# Patient Record
Sex: FEMALE | Race: WHITE | URBAN - METROPOLITAN AREA
[De-identification: names, ages, dates, MRNs, and addresses within clinical notes are randomized per-mention and may not be internally consistent; named-entity substitution may affect disease eponyms.]

---

## 2017-02-02 ENCOUNTER — TELEPHONE (OUTPATIENT)
Dept: NEUROSURGERY | Facility: CLINIC | Age: 22
End: 2017-02-02

## 2017-02-02 NOTE — TELEPHONE ENCOUNTER
I received a page that Ms. Banks is feeling ill and not able to get into contact with someone to answer her questions.  I attempted to contact patient at the number in the chart and there was not answer.      Ag Simental MD,PhD  Neurosurgery PGY-1

## 2017-02-08 ENCOUNTER — TELEPHONE (OUTPATIENT)
Dept: NEUROSURGERY | Facility: CLINIC | Age: 22
End: 2017-02-08

## 2017-02-08 NOTE — TELEPHONE ENCOUNTER
Osorio's mother is frustrated with care at the  and has asked for records to be forwarded elsewhere.  She was given the number for medical records to facilitate that process.

## 2017-02-14 ENCOUNTER — TELEPHONE (OUTPATIENT)
Dept: OPHTHALMOLOGY | Facility: CLINIC | Age: 22
End: 2017-02-14

## 2017-02-14 NOTE — TELEPHONE ENCOUNTER
PCP  referring pt to neuro-ophthalmology for intracranial hypertension eval.  Pt has seen dr. Sabillon in past  Has had stent in past for pain and lumbar puncture in past  Past 2-3 weeks terrible headache pain with dizziness and blurred vision.  Complaints of aphasia.  Pt has had symptoms for over one year when pressure goes up  Headaches 24/7 for over one year  Past 1-2 days symptoms better.  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 Sabillon visit (records were provided)    Offered appt tomorrow and pt/mother unable  Scheduled next week with dr. Babb  Date worked for pt/mother  Note to facilitator/Dr. Babb for review  Nabil Choi RN 9:05 AM 02/14/17

## 2017-02-23 DIAGNOSIS — H53.10 SUBJECTIVE VISUAL DISTURBANCE: ICD-10-CM

## 2017-02-23 DIAGNOSIS — G93.2 IDIOPATHIC INTRACRANIAL HYPERTENSION: ICD-10-CM

## 2017-02-24 ENCOUNTER — OFFICE VISIT (OUTPATIENT)
Dept: OPHTHALMOLOGY | Facility: CLINIC | Age: 22
End: 2017-02-24
Attending: OPHTHALMOLOGY
Payer: COMMERCIAL

## 2017-02-24 DIAGNOSIS — H53.10 SUBJECTIVE VISUAL DISTURBANCE: ICD-10-CM

## 2017-02-24 DIAGNOSIS — R51.9 HEADACHE: Primary | ICD-10-CM

## 2017-02-24 DIAGNOSIS — G93.2 IDIOPATHIC INTRACRANIAL HYPERTENSION: ICD-10-CM

## 2017-02-24 PROCEDURE — 92133 CPTRZD OPH DX IMG PST SGM ON: CPT | Mod: ZF | Performed by: OPHTHALMOLOGY

## 2017-02-24 PROCEDURE — 99215 OFFICE O/P EST HI 40 MIN: CPT | Mod: ZF

## 2017-02-24 PROCEDURE — 92083 EXTENDED VISUAL FIELD XM: CPT | Mod: ZF | Performed by: OPHTHALMOLOGY

## 2017-02-24 ASSESSMENT — CONF VISUAL FIELD
OD_NORMAL: 1
OS_NORMAL: 1

## 2017-02-24 ASSESSMENT — TONOMETRY
IOP_METHOD: TONOPEN
OD_IOP_MMHG: 20
OS_IOP_MMHG: 17

## 2017-02-24 ASSESSMENT — VISUAL ACUITY
METHOD: SNELLEN - LINEAR
OD_SC: 20/20
OS_SC: 20/20

## 2017-02-24 NOTE — NURSING NOTE
Chief Complaints and History of Present Illnesses   Patient presents with     Neurologic Problem     II     HPI    Symptoms:              Comments:  Zee is a 21 year old female presenting with a history of:    1. Migraine Headaches  - Onset 5 years ago.   - no visual aura with headaches  - causes photosensitivity   - constant   - wake her up at night, wakes up with them  - no specific location.   - lying down helps  - does not improve with rest  - 8/10, describes as an intense ache.   - no tinnitus  - no vision concerns OU  - no diplopia  - last brain MRI June 2016 (done in [place], [facility] [place] 2[place]   - history of three spinal taps in the past. Last one was 12/2016. Was told opening pressure was high.   - was not started on any medications  - history left transverse sinus stent 02/2016. Helped reduce pressures in head for a while, but now back to more pain.     Radha JC 9:26 AM February 24, 2017

## 2017-02-24 NOTE — MR AVS SNAPSHOT
After Visit Summary   2/24/2017    Zee Banks    MRN: [id number]           Patient Information     Date Of Birth          1995        Visit Information        Provider Department      2/24/2017 10:00 AM [facility]        Today's Diagnoses     Headache    -  1    Idiopathic intracranial hypertension        Subjective visual disturbance           Follow-ups after your visit        Additional Services     NEUROLOGY ADULT REFERRAL       Your provider has referred you to: [facility]: [facility] [phone]   [email or web address]    Reason for Referral: Consult    Please be aware that coverage of these services is subject to the terms and limitations of your health insurance plan.  Call member services at your health plan with any benefit or coverage questions.      Please bring the following with you to your appointment:    (1) Any X-Rays, CTs or MRIs which have been performed.  Contact the facility where they were done to arrange for  prior to your scheduled appointment.    (2) List of current medications  (3) This referral request   (4) Any documents/labs given to you for this referral                  Who to contact     Please call your clinic at [phone] to:    Ask questions about your health    Make or cancel appointments    Discuss your medicines    Learn about your test results    Speak to your doctor   If you have compliments or concerns about an experience at your clinic, or if you wish to file a complaint, please contact [facility] Patient Relations at [phone] or email us at [email or web address]         Additional Information About Your Visit        MyChart Information     [employer] is an electronic gateway that provides easy, online access to your medical records. With [employer], you can request a clinic appointment, read your test results, renew a prescription or  communicate with your care team.     To sign up for [email or web address] visit the website at [email or web address]   You will be asked to enter the access code listed below, as well as some personal information. Please follow the directions to create your username and password.     Your access code is: [email or web address]  Expires: 2017  8:30 AM     Your access code will  in 90 days. If you need help or a new code, please contact your [facility] or call [phone] for assistance.        Care EveryWhere ID     This is your Care EveryWhere ID. This could be used by other organizations to access your [place] medical records  [id number]         Blood Pressure from Last 3 Encounters:   16 119/68    Weight from Last 3 Encounters:   16 120 kg (264 lb 8 oz)              We Performed the Following     Glaucoma Top OU     IOP Measurement     NEUROLOGY ADULT REFERRAL     OCT Optic Nerve RNFL Spectralis OU (both eyes)        Primary Care Provider Office Phone # Fax #    Carlos Manuel Ortiz [phone]       [address] [address]        Thank you!     Thank you for choosing [facility]  for your care. Our goal is always to provide you with excellent care. Hearing back from our patients is one way we can continue to improve our services. Please take a few minutes to complete the written survey that you may receive in the mail after your visit with us. Thank you!             Your Updated Medication List - Protect others around you: Learn how to safely use, store and throw away your medicines at www.disposemymeds.org.          This list is accurate as of: 17 11:59 PM.  Always use your most recent med list.                   Brand Name Dispense Instructions for use    aspirin 325 MG tablet      Take 325 mg by mouth daily       MULTIVITAMIN ADULT PO      Take 1 tablet by mouth       VITAMIN D (CHOLECALCIFEROL) PO      Take 2,000  Units by mouth daily

## 2017-02-24 NOTE — LETTER
"2017    RE: Zee Banks  : 1995  MRN: [id number]    Dear Dr. Ortiz    Thank you for referring your patient, Zee Banks, to my neuro-ophthalmology clinic recently.  After a thorough neuro-ophthalmic history and examination, I came to the following conclusions:       1. History of pseudotumor cerebri diagnosed elsewhere- severe headaches with no evidence of papilledema on exam today- strongly suspect headaches due to primary headache disorder rather than increased intracranial pressure:    Ms. Banks is a 21 year old female who presents with headaches.  She states her headaches started approximately 5 years ago and felt they were holocephalic pressure or throbbing sensation.   It has been pretty constant since the beginning in .   Over the last 5 years it has progressed from \"headaches to migraines\", in that she is not able to function as well.   It is associated with photophobia, phonophobia, nausea.  She does not vomit due to the headaches.  She states she has a low grade pressure headache that is exacerbated by these episodes of severe headaches.  She states it can last from hours to days.  She has to lay in a dark, quiet room and close her eyes in order to deal with the headaches.  She takes Tylenol as needed but states it does not help.   She has been tried on (23 medications)Topamax, Venlafaxine, gabapentin, sumatriptan, acetazolamide, lyrica and all of them were stopped due to side effects or not effectively treating the headache.    She had her most recent MRI/MRV in 2016 that showed (per patient report and medical records) severe bilateral transverse sinus stenosis.  She had a lumbar puncture at that time that showed a opening pressure of 36, and had a transverse sinus stent on the left at that time.   She states she had improvement in her headaches immediately after the procedure, but pain never entirely resolved.   However, after about 4-5 months she has " "had recurrence of her symptoms, and feels she is now back at where she was pre-stent placement.   She thinks she has mildly progressed as far as pain in the past 4 months.  She has trouble being out in public, and states she is easily fatigued.  She also thinks she has word finding difficulties and that she has a \"brain fog\" when things are at their worst.   She denies blind spots in her vision, denies visual acuty issues, denies problems with color vision.    She states she has intermittent horizontal diplopia during severe headaches and when she is very tired.   She denies pain with eye movements.   She states she has occasional teraing, redness, and drying in her eyes.    She denies floaters in her vision.   She states she is down about 10 lbs in the past 4 months.  She had a recent repeat lumbar puncture in December 2016 with an opening pressure of 25 cm H20.    This patient had an essentially normal neuro-ophthalmic exam today including normal afferent visual function and normal efferent ocular motor examination.  There was no evidence of cranial nerve 6 palsy.  Her structural eye exam including slit lamp exam and dilated fundus exam was normal in both eyes.  There were spontaneous venous pulsations present in the right eye.  There was no evidence of papilledema in either eye.  There was no clear indication of prior papilledema such as gliosis or \"high water sade.\"    In conclusion, given that this patient's diagnosis of pseudotumor cerebri was made in the past and I don't have all the data, imaging, and optic nerve photos from that time it is hard for me to comment on her diagnosis of pseudotumor cerebri.  Given her body habitus and demographic and lumbar puncture with elevated opening pressure it is certainly possible that she had pseudotumor cerebri.  Today, however, her recent opening pressure was borderline normal / elevated and she shows no signs of increased intracranial pressure on neuro-ophthalmologic " exam.  She had spontaneous venous pulsations at the right optic nerve head.  There was no papilledema in either eye.  Many features of her headache are suggestive of migraine headaches.    I would suggest this patient pursue medical treatment for her headaches as I am not convinced they are related to increased intracranial pressure at this time.  Also the long term risks of venous sinus stents remain unclear.  Other surgical procedures to lower increased intracranial pressure carry other potential serious risks.    I did not make a follow-up appointment, but I would be happy to see the patient back in the future should any new neuro-ophthalmic concern arise.      Again, thank you for trusting me with the care of your patient.  For further exam details, please feel free to contact our office for additional records.  If you wish to contact me regarding this patient please email me at [email or web address] or give my clinic a call to arrange a phone conversation.    Sincerely,    Uziel Robledo MD  , Neuro-Ophthalmology and Adult Strabismus  Department of Ophthalmology and Visual Neurosciences  [facility]    DX: primary headache disorder, history of pseudotumor cerebri

## 2017-02-28 ASSESSMENT — SLIT LAMP EXAM - LIDS
COMMENTS: NORMAL
COMMENTS: NORMAL

## 2017-02-28 ASSESSMENT — EXTERNAL EXAM - RIGHT EYE: OD_EXAM: NORMAL

## 2017-02-28 ASSESSMENT — EXTERNAL EXAM - LEFT EYE: OS_EXAM: NORMAL

## 2017-02-28 NOTE — PROGRESS NOTES
"ASSESSMENT AND PLAN:         1. History of pseudotumor cerebri diagnosed elsewhere- severe headaches with no evidence of papilledema on exam today- strongly suspect headaches due to primary headache disorder rather than increased intracranial pressure:    Ms. Banks is a 21 year old female who presents with headaches.  She states her headaches started approximately 5 years ago and felt they were holocephalic pressure or throbbing sensation.   It has been pretty constant since the beginning in 2012.   Over the last 5 years it has progressed from \"headaches to migraines\", in that she is not able to function as well.   It is associated with photophobia, phonophobia, nausea.  She does not vomit due to the headaches.  She states she has a low grade pressure headache that is exacerbated by these episodes of severe headaches.  She states it can last from hours to days.  She has to lay in a dark, quiet room and close her eyes in order to deal with the headaches.  She takes Tylenol as needed but states it does not help.   She has been tried on (23 medications)Topamax, Venlafaxine, gabapentin, sumatriptan, acetazolamide, lyrica and all of them were stopped due to side effects or not effectively treating the headache.    She had her most recent MRI/MRV in 2/2016 that showed (per patient report and medical records) severe bilateral transverse sinus stenosis.  She had a lumbar puncture at that time that showed a opening pressure of 36, and had a transverse sinus stent on the left at that time.   She states she had improvement in her headaches immediately after the procedure, but pain never entirely resolved.   However, after about 4-5 months she has had recurrence of her symptoms, and feels she is now back at where she was pre-stent placement.   She thinks she has mildly progressed as far as pain in the past 4 months.  She has trouble being out in public, and states she is easily fatigued.  She also thinks she has word " "finding difficulties and that she has a \"brain fog\" when things are at their worst.   She denies blind spots in her vision, denies visual acuty issues, denies problems with color vision.    She states she has intermittent horizontal diplopia during severe headaches and when she is very tired.   She denies pain with eye movements.   She states she has occasional teraing, redness, and drying in her eyes.    She denies floaters in her vision.   She states she is down about 10 lbs in the past 4 months.  She had a recent repeat lumbar puncture in December 2016 with an opening pressure of 25 cm H20.    This patient had an essentially normal neuro-ophthalmic exam today including normal afferent visual function and normal efferent ocular motor examination.  There was no evidence of cranial nerve 6 palsy.  Her structural eye exam including slit lamp exam and dilated fundus exam was normal in both eyes.  There were spontaneous venous pulsations present in the right eye.  There was no evidence of papilledema in either eye.  There was no clear indication of prior papilledema such as gliosis or \"high water sade.\"    In conclusion, given that this patient's diagnosis of pseudotumor cerebri was made in the past and I don't have all the data, imaging, and optic nerve photos from that time it is hard for me to comment on her diagnosis of pseudotumor cerebri.  Given her body habitus and demographic and lumbar puncture with elevated opening pressure it is certainly possible that she had pseudotumor cerebri.  Today, however, her recent opening pressure was borderline normal / elevated and she shows no signs of increased intracranial pressure on neuro-ophthalmologic exam.  She had spontaneous venous pulsations at the right optic nerve head.  There was no papilledema in either eye.  Many features of her headache are suggestive of migraine headaches.    I would suggest this patient pursue medical treatment for her headaches as I am not " convinced they are related to increased intracranial pressure at this time.  Also the long term risks of venous sinus stents remain unclear.  Other surgical procedures to lower increased intracranial pressure carry other potential serious risks.    I did not make a follow-up appointment, but I would be happy to see the patient back in the future should any new neuro-ophthalmic concern arise.         Complete documentation of historical and exam elements from today's encounter can be found in the full encounter summary report (not reduplicated in this progress note).  I personally obtained the chief complaint(s) and history of present illness.  I confirmed and edited as necessary the review of systems, past medical/surgical history, family history, social history, and examination findings as documented by others; and I examined the patient myself.  I personally reviewed the relevant tests, images, and reports as documented above.  I formulated and edited as necessary the assessment and plan and discussed the findings and management plan with the patient and family   Uziel Robledo MD  10:50 AM  2/28/2017

## 2017-05-31 ENCOUNTER — TRANSFERRED RECORDS (OUTPATIENT)
Dept: HEALTH INFORMATION MANAGEMENT | Facility: CLINIC | Age: 22
End: 2017-05-31